# Patient Record
Sex: MALE | Race: WHITE | HISPANIC OR LATINO | Employment: FULL TIME | URBAN - METROPOLITAN AREA
[De-identification: names, ages, dates, MRNs, and addresses within clinical notes are randomized per-mention and may not be internally consistent; named-entity substitution may affect disease eponyms.]

---

## 2018-03-07 NOTE — PROGRESS NOTES
History of Present Illness    Revaccination   Vaccine Information: Vaccine(s) Given (names): Menactra [id number]  Unable to reach by phone  Pt called (attempt 1): [phone] KRL  Pt called (attempt 2): [phone] KRL   Letter Sent (Regular and Certified): [id number]  Other Information: [phone] [facility] cell for patient to call back  [phone] Certified letter sent  Active Problems    1  Acute bronchitis, unspecified organism (466 0) (J20 9)   2  Need for influenza vaccination (V04 81) (Z23)   3  Need for Menactra vaccination (V03 89) (Z23)   4  Need for revaccination (V05 9) (Z23)    Immunizations  Influenza --- Verl Nones: 08-Oct-2015   Meningococcal --- Series1: 08-Oct-2015     Current Meds   1  ProAir  (90 Base) MCG/ACT Inhalation Aerosol Solution; INHALE 2 PUFFS   EVERY 4 HOURS AS NEEDED FOR COUGH AND WHEEZE   2  Promethazine-DM 6 25-15 MG/5ML Oral Syrup; TAKE 5 ML EVERY 4 TO 6 HOURS AS   NEEDED FOR COUGH    Allergies    1   No Known Drug Allergies    Signatures   Electronically signed by : Crispin Martin MD; Jan 19 2018 11:00AM EST                       (Author)

## 2022-02-23 ENCOUNTER — HOSPITAL ENCOUNTER (EMERGENCY)
Facility: HOSPITAL | Age: 22
Discharge: HOME | End: 2022-02-23
Attending: EMERGENCY MEDICINE | Admitting: EMERGENCY MEDICINE
Payer: COMMERCIAL

## 2022-02-23 VITALS
SYSTOLIC BLOOD PRESSURE: 131 MMHG | BODY MASS INDEX: 28.14 KG/M2 | HEART RATE: 98 BPM | WEIGHT: 190 LBS | TEMPERATURE: 97.8 F | RESPIRATION RATE: 16 BRPM | DIASTOLIC BLOOD PRESSURE: 82 MMHG | HEIGHT: 69 IN | OXYGEN SATURATION: 96 %

## 2022-02-23 DIAGNOSIS — S39.012A STRAIN OF LUMBAR REGION: Primary | ICD-10-CM

## 2022-02-23 PROCEDURE — 96372 THER/PROPH/DIAG INJ SC/IM: CPT

## 2022-02-23 PROCEDURE — 99283 EMERGENCY DEPT VISIT LOW MDM: CPT

## 2022-02-23 PROCEDURE — 99284 EMERGENCY DEPT VISIT MOD MDM: CPT | Performed by: PHYSICIAN ASSISTANT

## 2022-02-23 RX ORDER — KETOROLAC TROMETHAMINE 30 MG/ML
15 INJECTION, SOLUTION INTRAMUSCULAR; INTRAVENOUS ONCE
Status: COMPLETED | OUTPATIENT
Start: 2022-02-23 | End: 2022-02-23

## 2022-02-23 RX ORDER — NAPROXEN 500 MG/1
500 TABLET ORAL 2 TIMES DAILY PRN
Qty: 10 TABLET | Refills: 0 | Status: SHIPPED | OUTPATIENT
Start: 2022-02-23

## 2022-02-23 RX ORDER — CYCLOBENZAPRINE HCL 10 MG
10 TABLET ORAL 2 TIMES DAILY PRN
Qty: 6 TABLET | Refills: 0 | Status: SHIPPED | OUTPATIENT
Start: 2022-02-23 | End: 2022-02-26

## 2022-02-23 RX ORDER — KETOROLAC TROMETHAMINE 30 MG/ML
15 INJECTION, SOLUTION INTRAMUSCULAR; INTRAVENOUS ONCE
Status: DISCONTINUED | OUTPATIENT
Start: 2022-02-23 | End: 2022-02-23

## 2022-02-23 RX ORDER — DIAZEPAM 5 MG/ML
5 INJECTION, SOLUTION INTRAMUSCULAR; INTRAVENOUS ONCE
Status: COMPLETED | OUTPATIENT
Start: 2022-02-23 | End: 2022-02-23

## 2022-02-23 RX ADMIN — DIAZEPAM 5 MG: 10 INJECTION, SOLUTION INTRAMUSCULAR; INTRAVENOUS at 11:51

## 2022-02-23 RX ADMIN — KETOROLAC TROMETHAMINE 15 MG: 30 INJECTION, SOLUTION INTRAMUSCULAR at 11:50

## 2022-02-23 NOTE — ED PROVIDER NOTES
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  He performs repetitive motion and dragging motion  No change in bowel bladder no saddle anesthesia  He walked in under his own power a senior remained in style  He only tried 1 dose of Motrin yesterday without improvement  He is nontoxic no acute distress  No history of IV drug use no red flags  No systemic symptoms  Patient appears to have muscle soreness from starting a new physical job          None       History reviewed  No pertinent past medical history  History reviewed  No pertinent surgical history  History reviewed  No pertinent family history  I have reviewed and agree with the history as documented  E-Cigarette/Vaping     E-Cigarette/Vaping Substances     Social History     Tobacco Use    Smoking status: Light Tobacco Smoker    Smokeless tobacco: Current User   Substance Use Topics    Alcohol use: Never    Drug use: Never       Review of Systems   Constitutional: Negative for diaphoresis, fatigue and fever  HENT: Negative for congestion, ear pain, nosebleeds and sore throat  Eyes: Negative for photophobia, pain, discharge and visual disturbance  Respiratory: Negative for cough, choking, chest tightness, shortness of breath and wheezing  Cardiovascular: Negative for chest pain and palpitations  Gastrointestinal: Negative for abdominal distention, abdominal pain, diarrhea and vomiting  Genitourinary: Negative for dysuria, flank pain and frequency  Musculoskeletal: Positive for back pain  Negative for gait problem and joint swelling  Skin: Negative for color change and rash  Neurological: Negative for dizziness, syncope and headaches  Psychiatric/Behavioral: Negative for behavioral problems and confusion   The patient is not nervous/anxious  All other systems reviewed and are negative  Physical Exam  Physical Exam  Vitals and nursing note reviewed  Constitutional:       General: He is not in acute distress  Appearance: Normal appearance  He is well-developed  He is not ill-appearing, toxic-appearing or diaphoretic  HENT:      Head: Normocephalic and atraumatic  Right Ear: Tympanic membrane, ear canal and external ear normal       Left Ear: Tympanic membrane, ear canal and external ear normal       Nose: Nose normal       Mouth/Throat:      Mouth: Mucous membranes are moist       Pharynx: Oropharynx is clear  No oropharyngeal exudate or posterior oropharyngeal erythema  Eyes:      General: No scleral icterus  Right eye: No discharge  Left eye: No discharge  Conjunctiva/sclera: Conjunctivae normal       Pupils: Pupils are equal, round, and reactive to light  Cardiovascular:      Rate and Rhythm: Normal rate and regular rhythm  Pulmonary:      Effort: Pulmonary effort is normal       Breath sounds: Normal breath sounds  Abdominal:      General: Bowel sounds are normal       Palpations: Abdomen is soft  Tenderness: There is no abdominal tenderness  Musculoskeletal:         General: Normal range of motion  Cervical back: Normal range of motion and neck supple  Back:       Right lower leg: No edema  Left lower leg: No edema  Skin:     General: Skin is warm  Capillary Refill: Capillary refill takes less than 2 seconds  Neurological:      General: No focal deficit present  Mental Status: He is alert and oriented to person, place, and time  Mental status is at baseline     Psychiatric:         Mood and Affect: Mood normal          Behavior: Behavior normal          Vital Signs  ED Triage Vitals [02/23/22 1116]   Temperature Pulse Respirations Blood Pressure SpO2   97 8 °F (36 6 °C) 98 16 131/82 96 %      Temp Source Heart Rate Source Patient Position - Orthostatic VS BP Location FiO2 (%)   Oral Monitor Sitting Right arm --      Pain Score       --           Vitals:    02/23/22 1116   BP: 131/82   Pulse: 98   Patient Position - Orthostatic VS: Sitting         Visual Acuity      ED Medications  Medications   diazepam (VALIUM) injection 5 mg (has no administration in time range)   ketorolac (TORADOL) injection 15 mg (has no administration in time range)       Diagnostic Studies  Results Reviewed     None                 No orders to display              Procedures  Procedures         ED Course                                             MDM    Disposition  Final diagnoses:   Strain of lumbar region     Time reflects when diagnosis was documented in both MDM as applicable and the Disposition within this note     Time User Action Codes Description Comment    2/23/2022 11:36 AM Rojelio Durand Add [S39 012A] Strain of lumbar region       ED Disposition     ED Disposition Condition Date/Time Comment    Discharge Stable Wed Feb 23, 2022 11:35 AM Miroslava Grimm discharge to home/self care              Follow-up Information     Follow up With Specialties Details Why Contact Info Additional [address] [facility] Schedule an appointment as soon as possible for a visit   [address], [address]          Patient's Medications   Discharge Prescriptions    CYCLOBENZAPRINE (FLEXERIL) 10 MG TABLET    Take 1 tablet (10 mg total) by mouth 2 (two) times a day as needed for muscle spasms for up to 3 days       Start Date: 2/23/2022 End Date: 2/26/2022       Order Dose: 10 mg       Quantity: 6 tablet    Refills: 0    NAPROXEN (NAPROSYN) 500 MG TABLET    Take 1 tablet (500 mg total) by mouth 2 (two) times a day as needed for mild pain       Start Date: 2/23/2022 End Date: --       Order Dose: 500 mg       Quantity: 10 tablet    Refills: 0       No discharge procedures on file      PDMP Review     None          ED Provider  Electronically Signed by           Millicent Denise PA-C  02/23/22 2795

## 2022-02-23 NOTE — Clinical Note
Tinlester Hernandez was seen and treated in our emergency department on 2/23/2022  Diagnosis: Lumbar strain    Ritesh Herndon    He may return on this date: 02/24/2022         If you have any questions or concerns, please don't hesitate to call        Ross Goldstein PA-C    ______________________________           _______________          _______________  Hospital Representative                              Date                                Time

## 2022-02-23 NOTE — Clinical Note
Imtiaz Martino was seen and treated in our emergency department on 2/23/2022  Diagnosis: Lumbar strain    Sushila Pascual    He may return on this date: 02/24/2022         If you have any questions or concerns, please don't hesitate to call        Arian Santiago PA-C    ______________________________           _______________          _______________  Hospital Representative                              Date                                Time

## 2022-02-23 NOTE — DISCHARGE INSTRUCTIONS
Follow-up with your family doctor for ongoing care    Return any worsening symptoms questions comments concerns

## 2024-06-25 ENCOUNTER — OFFICE VISIT (OUTPATIENT)
Dept: URGENT CARE | Facility: CLINIC | Age: 24
End: 2024-06-25

## 2024-06-25 VITALS
OXYGEN SATURATION: 99 % | TEMPERATURE: 97.3 F | RESPIRATION RATE: 18 BRPM | WEIGHT: 220 LBS | SYSTOLIC BLOOD PRESSURE: 129 MMHG | HEIGHT: 70 IN | HEART RATE: 74 BPM | BODY MASS INDEX: 31.5 KG/M2 | DIASTOLIC BLOOD PRESSURE: 79 MMHG

## 2024-06-25 DIAGNOSIS — J02.0 ACUTE STREPTOCOCCAL PHARYNGITIS: Primary | ICD-10-CM

## 2024-06-25 DIAGNOSIS — J02.9 SORE THROAT: ICD-10-CM

## 2024-06-25 LAB — S PYO AG THROAT QL: POSITIVE

## 2024-06-25 RX ORDER — AMOXICILLIN 500 MG/1
500 CAPSULE ORAL EVERY 12 HOURS SCHEDULED
Qty: 20 CAPSULE | Refills: 0 | Status: SHIPPED | OUTPATIENT
Start: 2024-06-25 | End: 2024-07-05

## 2024-06-25 NOTE — PROGRESS NOTES
"  [facility]        NAME: Scott Aldana is a 24 y.o. male  : 2000    MRN: [id number]  DATE: 2024  TIME: 11:27 AM    Assessment and Plan   Acute streptococcal pharyngitis [J02.0]  1. Acute streptococcal pharyngitis  amoxicillin (AMOXIL) 500 mg capsule      2. Sore throat  POCT rapid ANTIGEN strepA        Patient Instructions   Strep throat  Rapid strep positive  Rx amoxicillin twice daily x 7 days sent via EMR  Recommend warm salt water gargles  tylenol/ibuprofen as needed for pain/fever  Rest, fluids and supportive care     Follow up with PCP in 3-5 days.  Proceed to  ER if symptoms worsen.    If tests have been performed at [facility], our office will contact you with results if changes need to be made to the care plan discussed with you at the visit.  You can review your full results on [facility] MyChart.    Chief Complaint     Chief Complaint   Patient presents with    Cold Like Symptoms     C/O of dizziness, productive cough, chills, weak, fatigue and \"a migraine since Thursday.\" Took Aleve, Ibuprofen and Excedrin Migraine but does not help.         History of Present Illness       Scott is a 24-year-old male who presents to clinic complaining fatigue x 3 days.  Also notes diffuse headache, chills, cough, and nausea.  He describes the cough as productive with a white to yellow sputum.  He also notes night sweats.  He denies any fever, sore throat, ear pain, shortness of breath, vomiting, diarrhea, loss of taste or smell, recent travel, or any known sick contacts.        Review of Systems   Review of Systems   Constitutional:  Positive for chills and fatigue. Negative for fever.   HENT:  Negative for congestion, ear pain, rhinorrhea and sore throat.    Respiratory:  Positive for cough. Negative for shortness of breath.    Gastrointestinal:  Positive for nausea. Negative for diarrhea and vomiting.   Musculoskeletal:  Negative for myalgias.   Neurological:  Negative for headaches. " "        Current Medications       Current Outpatient Medications:     amoxicillin (AMOXIL) 500 mg capsule, Take 1 capsule (500 mg total) by mouth every 12 (twelve) hours for 10 days, Disp: 20 capsule, Rfl: 0    Current Allergies     Allergies as of 06/25/2024    (No Known Allergies)            The following portions of the patient's history were reviewed and updated as appropriate: allergies, current medications, past family history, past medical history, past social history, past surgical history and problem list.     History reviewed. No pertinent past medical history.    History reviewed. No pertinent surgical history.    Family History   Problem Relation Age of Onset    Diabetes Mother     Diabetes Father          Medications have been verified.        Objective   /79   Pulse 74   Temp (!) 97.3 °F (36.3 °C)   Resp 18   Ht 5' 10\" (1.778 m)   Wt 99.8 kg (220 lb)   SpO2 99%   BMI 31.57 kg/m²   No LMP for male patient.       Physical Exam     Physical Exam  Vitals and nursing note reviewed.   Constitutional:       General: He is not in acute distress.     Appearance: Normal appearance. He is not ill-appearing.   HENT:      Right Ear: Tympanic membrane, ear canal and external ear normal.      Left Ear: Tympanic membrane, ear canal and external ear normal.      Nose: Nose normal.      Mouth/Throat:      Mouth: Mucous membranes are moist.      Pharynx: Posterior oropharyngeal erythema present. No oropharyngeal exudate.   Cardiovascular:      Rate and Rhythm: Normal rate and regular rhythm.      Heart sounds: Normal heart sounds.   Pulmonary:      Effort: Pulmonary effort is normal.      Breath sounds: Normal breath sounds.   Lymphadenopathy:      Cervical: Cervical adenopathy present.   Neurological:      Mental Status: He is alert and oriented to person, place, and time.   Psychiatric:         Mood and Affect: Mood normal.         Behavior: Behavior normal.                   "

## 2024-06-25 NOTE — LETTER
June 25, 2024     Patient: Scott Aldana   YOB: 2000   Date of Visit: 6/25/2024       To Whom it May Concern:    Scott Aldana was seen in my clinic on 6/25/2024.  Please excuse from work on 6/25/2024 and 6/26/2024.    If you have any questions or concerns, please don't hesitate to call.         Sincerely,          Elena Underwood PA-C        CC: No Recipients

## 2024-08-06 ENCOUNTER — OFFICE VISIT (OUTPATIENT)
Age: 24
End: 2024-08-06

## 2024-08-06 VITALS
WEIGHT: 224 LBS | SYSTOLIC BLOOD PRESSURE: 132 MMHG | HEIGHT: 70 IN | RESPIRATION RATE: 18 BRPM | DIASTOLIC BLOOD PRESSURE: 78 MMHG | BODY MASS INDEX: 32.07 KG/M2 | HEART RATE: 88 BPM | OXYGEN SATURATION: 98 %

## 2024-08-06 DIAGNOSIS — R21 RASH AND NONSPECIFIC SKIN ERUPTION: ICD-10-CM

## 2024-08-06 DIAGNOSIS — E66.09 CLASS 1 OBESITY DUE TO EXCESS CALORIES WITHOUT SERIOUS COMORBIDITY WITH BODY MASS INDEX (BMI) OF 32.0 TO 32.9 IN ADULT: ICD-10-CM

## 2024-08-06 DIAGNOSIS — Z76.89 ENCOUNTER TO ESTABLISH CARE WITH NEW DOCTOR: Primary | ICD-10-CM

## 2024-08-06 DIAGNOSIS — Z59.89 UNINSURED: ICD-10-CM

## 2024-08-06 DIAGNOSIS — R53.83 OTHER FATIGUE: ICD-10-CM

## 2024-08-06 DIAGNOSIS — M79.671 BILATERAL FOOT PAIN: ICD-10-CM

## 2024-08-06 DIAGNOSIS — M79.672 BILATERAL FOOT PAIN: ICD-10-CM

## 2024-08-06 PROBLEM — Z87.39: Status: ACTIVE | Noted: 2024-08-06

## 2024-08-06 PROCEDURE — 99203 OFFICE O/P NEW LOW 30 MIN: CPT | Performed by: FAMILY MEDICINE

## 2024-08-06 SDOH — ECONOMIC STABILITY - INCOME SECURITY: OTHER PROBLEMS RELATED TO HOUSING AND ECONOMIC CIRCUMSTANCES: Z59.89

## 2024-08-06 NOTE — ASSESSMENT & PLAN NOTE
Has not tried anything  - Nonspecific erythema on inner thigh spread by scratching. Reports irritation worse with gowning at work?  - Advised to try OTC hydrocortisone  - Follow up at annual physical if not improving, consider antifungal

## 2024-08-06 NOTE — PROGRESS NOTES
Ambulatory Visit  Name: Andrea Ramon      : 2000      MRN: [id number]  Encounter Provider: Jaspal Wood MD  Encounter Date: 2024   Encounter department: [facility]    Assessment & Plan   1. Encounter to establish care with new doctor  2. Bilateral foot pain  Assessment & Plan:  Bilateral foot pain, getting more difficult to stand at work.   - Per patient he has previous history of tendonitis and may have had shoe inserts for flat feet. Not currently taking anything for the pain   - Advised to restart NSAID such as Ibuprofen, Aleve, etc  - Recommended PT. Patient will wait to see if any improvement with NSAID's before trying PT given he is self pay, but open to it  - Follow up at annual physical  3. Rash of inner thigh  Assessment & Plan:  Has not tried anything  - Nonspecific erythema on inner thigh spread by scratching. Reports irritation worse with gowning at work?  - Advised to try OTC hydrocortisone  - Follow up at annual physical if not improving, consider antifungal  4. Class 1 obesity due to excess calories without serious comorbidity with body mass index (BMI) of 32.0 to 32.9 in adult  -     Comprehensive metabolic panel; Future  -     Lipid Panel with Direct LDL reflex; Future  5. Other fatigue  -     CBC and differential; Future  6. Uninsured       History of Present Illness   {Disappearing Hyperlinks I Encounters * My Last Note * Since Last Visit * History :51594}  Presents to establish care.  Per patient, has worsening bilateral foot pain and reported history of tendinitis.  Patient reports it is difficult to stand for long periods of time now, works as a , in a prep room (industry?) but has pain after standing too long. He states he was previously taking NSAID and muscle relaxer, but currently not taking any medicine for the pain. States he has never tried PT.  May have had shoe inserts in the past for flatfeet.  Patient also reports rash on  "inner leg, which she thinks may be from the gown he wears at work.  Reports overall fatigue.  Self-pay, would like to check basic labs especially sugar.        Review of Systems   Constitutional:  Negative for fever.   Respiratory:  Negative for shortness of breath.    Cardiovascular:  Negative for chest pain.   Gastrointestinal:  Negative for abdominal pain.   Genitourinary:  Negative for dysuria, hematuria and penile discharge.   Musculoskeletal:         Bilateral foot pain   Skin:  Positive for rash (Inner thigh).   Neurological:  Negative for dizziness.   All other systems reviewed and are negative.      Objective   {Disappearing Hyperlinks   Review Vitals * Enter New Vitals * Results Review * Labs * Imaging * Cardiology * Procedures * Lung Cancer Screening :31326}  /78 (BP Location: Left arm, Patient Position: Sitting)   Pulse 88   Resp 18   Ht 5' 9.5\" (1.765 m)   Wt 102 kg (224 lb)   SpO2 98%   BMI 32.60 kg/m²     Physical Exam  Vitals reviewed.   Constitutional:       Appearance: He is obese. He is not ill-appearing.      Comments: Pleasant young adult male in no apparent distress. Girlfriend present at visit   HENT:      Head: Normocephalic.      Mouth/Throat:      Mouth: Mucous membranes are moist.   Eyes:      Extraocular Movements: Extraocular movements intact.      Conjunctiva/sclera: Conjunctivae normal.   Cardiovascular:      Rate and Rhythm: Normal rate and regular rhythm.      Pulses: Normal pulses.      Heart sounds: Normal heart sounds. No murmur heard.     No gallop.   Pulmonary:      Effort: Pulmonary effort is normal. No respiratory distress.      Breath sounds: Normal breath sounds. No wheezing.   Abdominal:      Palpations: Abdomen is soft.      Tenderness: There is no abdominal tenderness. There is no guarding.   Musculoskeletal:         General: No swelling.      Comments: Tenderness over medial arches of bilateral feet   Skin:     General: Skin is warm and dry.   Neurological: "      Mental Status: He is alert.      Gait: Gait abnormal.   Psychiatric:         Mood and Affect: Mood normal.         Behavior: Behavior normal.       Administrative Statements {Disappearing Hyperlinks I  [facility] of Service [id number]}

## 2024-08-06 NOTE — ASSESSMENT & PLAN NOTE
Bilateral foot pain, getting more difficult to stand at work.   - Per patient he has previous history of tendonitis and may have had shoe inserts for flat feet. Not currently taking anything for the pain   - Advised to restart NSAID such as Ibuprofen, Aleve, etc  - Recommended PT. Patient will wait to see if any improvement with NSAID's before trying PT given he is self pay, but open to it  - Follow up at annual physical

## 2024-08-06 NOTE — LETTER
August 6, 2024     Patient: Andrea Ramon  YOB: 2000  Date of Visit: 8/6/2024      To Whom it May Concern:    Andrea Ramon is under my professional care. Andrea was seen in my office on 8/6/2024. Andrea     Please allow Andrea to take frequent breaks (Every 90 minutes) as needed and allow him to sit as needed. He has a medical condition which is being optimized. Avoid gowning with scrubs if possible.     If you have any questions or concerns, please don't hesitate to call.      Sincerely,        Jaspal Wood MD        CC: No Recipients

## 2024-10-01 ENCOUNTER — HOSPITAL ENCOUNTER (EMERGENCY)
Facility: HOSPITAL | Age: 24
Discharge: HOME | End: 2024-10-01
Attending: EMERGENCY MEDICINE
Payer: COMMERCIAL

## 2024-10-01 VITALS
SYSTOLIC BLOOD PRESSURE: 131 MMHG | DIASTOLIC BLOOD PRESSURE: 76 MMHG | OXYGEN SATURATION: 98 % | TEMPERATURE: 97.6 F | HEART RATE: 54 BPM | RESPIRATION RATE: 18 BRPM

## 2024-10-01 DIAGNOSIS — G43.909 MIGRAINE HEADACHE: Primary | ICD-10-CM

## 2024-10-01 LAB
ALBUMIN SERPL BCG-MCNC: 4.7 G/DL (ref 3.5–5)
ALP SERPL-CCNC: 44 U/L (ref 34–104)
ALT SERPL W P-5'-P-CCNC: 51 U/L (ref 7–52)
ANION GAP SERPL CALCULATED.3IONS-SCNC: 8 MMOL/L (ref 4–13)
AST SERPL W P-5'-P-CCNC: 27 U/L (ref 13–39)
BASOPHILS # BLD AUTO: 0.12 THOUSANDS/ÂΜL (ref 0–0.1)
BASOPHILS NFR BLD AUTO: 2 % (ref 0–1)
BILIRUB SERPL-MCNC: 0.63 MG/DL (ref 0.2–1)
BUN SERPL-MCNC: 17 MG/DL (ref 5–25)
CALCIUM SERPL-MCNC: 9.5 MG/DL (ref 8.4–10.2)
CHLORIDE SERPL-SCNC: 104 MMOL/L (ref 96–108)
CO2 SERPL-SCNC: 23 MMOL/L (ref 21–32)
CREAT SERPL-MCNC: 0.91 MG/DL (ref 0.6–1.3)
EOSINOPHIL # BLD AUTO: 0.41 THOUSAND/ÂΜL (ref 0–0.61)
EOSINOPHIL NFR BLD AUTO: 6 % (ref 0–6)
ERYTHROCYTE [DISTWIDTH] IN BLOOD BY AUTOMATED COUNT: 12.6 % (ref 11.6–15.1)
GFR SERPL CREATININE-BSD FRML MDRD: 115 ML/MIN/1.73SQ M
GLUCOSE SERPL-MCNC: 103 MG/DL (ref 65–140)
HCT VFR BLD AUTO: 46.1 % (ref 36.5–49.3)
HGB BLD-MCNC: 15.9 G/DL (ref 12–17)
IMM GRANULOCYTES # BLD AUTO: 0.03 THOUSAND/UL (ref 0–0.2)
IMM GRANULOCYTES NFR BLD AUTO: 1 % (ref 0–2)
LYMPHOCYTES # BLD AUTO: 1.59 THOUSANDS/ÂΜL (ref 0.6–4.47)
LYMPHOCYTES NFR BLD AUTO: 25 % (ref 14–44)
MCH RBC QN AUTO: 29.4 PG (ref 26.8–34.3)
MCHC RBC AUTO-ENTMCNC: 34.5 G/DL (ref 31.4–37.4)
MCV RBC AUTO: 85 FL (ref 82–98)
MONOCYTES # BLD AUTO: 0.42 THOUSAND/ÂΜL (ref 0.17–1.22)
MONOCYTES NFR BLD AUTO: 7 % (ref 4–12)
NEUTROPHILS # BLD AUTO: 3.87 THOUSANDS/ÂΜL (ref 1.85–7.62)
NEUTS SEG NFR BLD AUTO: 59 % (ref 43–75)
NRBC BLD AUTO-RTO: 0 /100 WBCS
PLATELET # BLD AUTO: 283 THOUSANDS/UL (ref 149–390)
PMV BLD AUTO: 11.7 FL (ref 8.9–12.7)
POTASSIUM SERPL-SCNC: 4.6 MMOL/L (ref 3.5–5.3)
PROT SERPL-MCNC: 7.6 G/DL (ref 6.4–8.4)
RBC # BLD AUTO: 5.4 MILLION/UL (ref 3.88–5.62)
SODIUM SERPL-SCNC: 135 MMOL/L (ref 135–147)
WBC # BLD AUTO: 6.44 THOUSAND/UL (ref 4.31–10.16)

## 2024-10-01 PROCEDURE — 85025 COMPLETE CBC W/AUTO DIFF WBC: CPT | Performed by: EMERGENCY MEDICINE

## 2024-10-01 PROCEDURE — 99283 EMERGENCY DEPT VISIT LOW MDM: CPT

## 2024-10-01 PROCEDURE — 96372 THER/PROPH/DIAG INJ SC/IM: CPT

## 2024-10-01 PROCEDURE — 36415 COLL VENOUS BLD VENIPUNCTURE: CPT | Performed by: EMERGENCY MEDICINE

## 2024-10-01 PROCEDURE — 80053 COMPREHEN METABOLIC PANEL: CPT | Performed by: EMERGENCY MEDICINE

## 2024-10-01 PROCEDURE — 96365 THER/PROPH/DIAG IV INF INIT: CPT

## 2024-10-01 PROCEDURE — 96375 TX/PRO/DX INJ NEW DRUG ADDON: CPT

## 2024-10-01 PROCEDURE — 99284 EMERGENCY DEPT VISIT MOD MDM: CPT | Performed by: EMERGENCY MEDICINE

## 2024-10-01 RX ORDER — KETOROLAC TROMETHAMINE 30 MG/ML
30 INJECTION, SOLUTION INTRAMUSCULAR; INTRAVENOUS ONCE
Status: COMPLETED | OUTPATIENT
Start: 2024-10-01 | End: 2024-10-01

## 2024-10-01 RX ORDER — DIPHENHYDRAMINE HYDROCHLORIDE 50 MG/ML
25 INJECTION INTRAMUSCULAR; INTRAVENOUS ONCE
Status: COMPLETED | OUTPATIENT
Start: 2024-10-01 | End: 2024-10-01

## 2024-10-01 RX ORDER — METOCLOPRAMIDE HYDROCHLORIDE 5 MG/ML
10 INJECTION INTRAMUSCULAR; INTRAVENOUS ONCE
Status: COMPLETED | OUTPATIENT
Start: 2024-10-01 | End: 2024-10-01

## 2024-10-01 RX ORDER — BUTALBITAL, ACETAMINOPHEN AND CAFFEINE 50; 325; 40 MG/1; MG/1; MG/1
1 TABLET ORAL EVERY 6 HOURS PRN
Qty: 30 TABLET | Refills: 0 | Status: SHIPPED | OUTPATIENT
Start: 2024-10-01 | End: 2024-10-11

## 2024-10-01 RX ORDER — SUMATRIPTAN 6 MG/.5ML
6 INJECTION, SOLUTION SUBCUTANEOUS ONCE
Status: COMPLETED | OUTPATIENT
Start: 2024-10-01 | End: 2024-10-01

## 2024-10-01 RX ORDER — MAGNESIUM SULFATE HEPTAHYDRATE 40 MG/ML
2 INJECTION, SOLUTION INTRAVENOUS ONCE
Status: COMPLETED | OUTPATIENT
Start: 2024-10-01 | End: 2024-10-01

## 2024-10-01 RX ADMIN — MAGNESIUM SULFATE HEPTAHYDRATE 2 G: 40 INJECTION, SOLUTION INTRAVENOUS at 08:07

## 2024-10-01 RX ADMIN — SODIUM CHLORIDE 1000 ML: 0.9 INJECTION, SOLUTION INTRAVENOUS at 07:59

## 2024-10-01 RX ADMIN — SUMATRIPTAN 6 MG: 6 INJECTION, SOLUTION SUBCUTANEOUS at 08:17

## 2024-10-01 RX ADMIN — METOCLOPRAMIDE 10 MG: 5 INJECTION, SOLUTION INTRAMUSCULAR; INTRAVENOUS at 07:57

## 2024-10-01 RX ADMIN — KETOROLAC TROMETHAMINE 30 MG: 30 INJECTION, SOLUTION INTRAMUSCULAR; INTRAVENOUS at 08:06

## 2024-10-01 RX ADMIN — DIPHENHYDRAMINE HYDROCHLORIDE 25 MG: 50 INJECTION, SOLUTION INTRAMUSCULAR; INTRAVENOUS at 07:55

## 2024-10-01 NOTE — Clinical Note
Andrea Ramon was seen and treated in our emergency department on 10/1/2024.                Diagnosis:     Andrea  may return to work on return date.    He may return on this date:          If you have any questions or concerns, please don't hesitate to call.      Monica Ceron, DO    ______________________________           _______________          _______________  Hospital Representative                              Date                                Time

## 2024-10-01 NOTE — ED CARE HANDOFF
Emergency Department Sign Out Note        Sign out and transfer of care from previous provider. See Separate Emergency Department note.     The patient, Andrea Ramon, was evaluated by the previous provider for migraine headache.    Workup Completed:      ED Course / Workup Pending (followup):  Blood work and migraine cocktail                                  ED Course as of 10/01/24 0927   Tue Oct 01, 2024   0919 Patient reexamined at bedside.  Headache is significantly improved.  Patient is requesting to go home with a work note.  Patient also requesting contact information for neurology.     Procedures  Medical Decision Making  Patient's lab work was unremarkable.  Patient felt significantly better after migraine cocktail.  At this time patient will be discharged home on Fioricet and follow-up to neurology.  Close return instructions given to return to the ER for any worsening symptoms.  Patient agrees with discharge plan.  Patient well appearing at time of discharge.    Please Note: Fluency Direct voice recognition software may have been used in the creation of this document. Wrong words or sound a like substitutions may have occurred due to the inherent limitations of the voice software.         Amount and/or Complexity of Data Reviewed  Labs: ordered.    Risk  Prescription drug management.            Disposition  Final diagnoses:   Migraine headache     Time reflects when diagnosis was documented in both MDM as applicable and the Disposition within this note       Time User Action Codes Description Comment    10/1/2024  9:21 AM Monica Ceron Add [G43.909] Migraine headache           ED Disposition       ED Disposition   Discharge    Condition   Stable    Date/Time   Tue Oct 1, 2024  9:21 AM    Comment   Andrea Ramon discharge to home/self care.                   Follow-up Information       Follow up With Specialties Details Why Contact Info    [facility] Schedule an appointment as  soon as possible for a visit   [address] [place] [id number]  [phone]            Patient's Medications   Discharge Prescriptions    BUTALBITAL-ACETAMINOPHEN-CAFFEINE (FIORICET,ESGIC) -40 MG PER TABLET    Take 1 tablet by mouth every 6 (six) hours as needed for headaches or migraine for up to 10 days       Start Date: 10/1/2024 End Date: 10/11/2024       Order Dose: 1 tablet       Quantity: 30 tablet    Refills: 0     No discharge procedures on file.       ED Provider  Electronically Signed by     Monica Ceron DO  10/01/24 0927

## 2024-10-02 ENCOUNTER — TELEPHONE (OUTPATIENT)
Age: 24
End: 2024-10-02

## 2024-10-02 NOTE — TELEPHONE ENCOUNTER
Attempted Contacting Patient regarding recent ED Visit on 10/01/24.    Left message asking Patient to call the office to schedule Follow up appointment. Provided office hours and phone number.       ED:[facility]  CC: Migraine  DX:Migraine Headache  Time: 5:52am   Last OV:8/6/24

## 2024-10-04 NOTE — ED PROVIDER NOTES
Final Diagnosis:  1. Migraine headache        Chief Complaint   Patient presents with    Migraine     Pt states he's been had migraines for 8years states he's been having one for the last 2 years.  Pt states he might be diabetic sugar makes the headache go away     HPI  Pt has a  long hx of migraines. 8 years. Lately been inc in freq. 1 HA per week. No red flags like trauma, neck stiffness fever, neuro def, sudden thunderclap, morning ha, urinary incont personality change etc. Has photophobia. It's whole head. Pounding. This one started yest, tried OTC.     They do note they'd like to make sure he's not diabetic.     EMS additionally reports:     - Previous charting underwent limited review with attention to last ED visits, labs, ekgs, and prior imaging.  Chart review reveals :     Office Visit on 06/25/2024   Component Date Value Ref Range Status     RAPID STREP A 06/25/2024 Positive (A)  Negative Final       - No language barrier.   - History obtained from patient    - Discuss patient's care, with patient permission or by chart review, with sig other.      PMH:   has a past medical history of Abscess of eyelid (09/05/2012).    PSH:   has no past surgical history on file.     Social History:  Tobacco Use: Medium Risk (10/1/2024)    Patient History     Smoking Tobacco Use: Former     Smokeless Tobacco Use: Never     Passive Exposure: Not on file     Alcohol Use: Not At Risk (8/19/2020)    Received from [facility]    AUDIT-C     Frequency of Alcohol Consumption: Never     Average Number of Drinks: Not on file     Frequency of Binge Drinking: Not on file     No illicit use       ROS:  Pertinent positives/negatives: .     Some ROS may be present in the HPI and would take precedent over these standard questions asked below.   Review of Systems   Constitutional:  Negative for chills, diaphoresis, fatigue and fever.   Eyes:  Positive for photophobia.   Gastrointestinal:  Positive for nausea.   Musculoskeletal:   Negative for neck stiffness.   Neurological:  Positive for headaches. Negative for seizures, facial asymmetry, speech difficulty, weakness and numbness.        CONSTITUTIONAL:  No lethargy. No unexpected weight loss. No change in behavior.  EYES:  No pain, redness, or discharge. No loss of vision. No orbital trauma or pain.   ENT:  No tinnitus or decreased hearing. No epistaxis/purulent rhinorrhea. No voice change, airway closing, trismus.   CARDIOVASCULAR:  No chest pain. No skin mottling or pallor. No change in exertional capacity  RESPIRATORY:  No hemoptysis. No paroxysmal nocturnal dyspnea. No stridor. No apnea or bluing.   GASTROINTESTINAL:  No vomiting, diarrhea. No distension. No melena. No hematochezia.   GENITOURINARY:  No nocturia. No hematuria or foul smelling or cloudy urine. No discharge. No sores/adenopathy.   MUSCULOSKELETAL:  No contracture.  No new deformity.   INTEGUMENTARY:  No swelling. No unexpected contusions. No abrasions. No lymphangitis.  NEUROLOGIC:  No meningismus. No new numbness of the extremities. No new focal weakness. No postural instability  PSYCHIATRIC:  No SI HI AVH  HEMATOLOGICAL:  No bleeding. No petechiae. No bruising.  ALLERGIES:  No urticaria. No sudden abd cramping. No stridor.    PE:     Physical exam highlights:   Physical Exam       Vitals:    10/01/24 0637 10/01/24 0900   BP: 131/76 131/76   BP Location: Right arm    Pulse: 76 (!) 54   Resp: 16 18   Temp: 97.6 °F (36.4 °C)    TempSrc: Oral    SpO2: 98% 98%     Vitals reviewed by me.   Nursing note reviewed  Chaperone present for all sensitive exam.  Const: No acute distress. Alert. Nontoxic. Not diaphoretic.    HEENT: External ears normal. No protrusion drainage swelling. Nose normal. No drainage/traumatic deformity. MM. Mouth with baseline/symmetric movement. No trismus.   Eyes: No squinting. No icterus. No tearing/swelling/drainage. Tracks through the room with normal EOM.   Neck: ROM normal. No rigidity. No  meningismus.  Cards: Rate as per vitals Compared to monitor sinus unless documented. Regular Well perfused.  Pulm: Effort and excursion normal. No distress. No audible wheezing/no stridor. Normal resp rate without retraction or change in work of breathing.  Abd: No distension beyond baseline. No fluctuant wave. Patient without peritoneal pain with shifting/bumping the bed.   MSK: ROM normal baseline. No deformity. No contractures from baseline.   Skin: No new rashes visible. Well perfused. No wounds visualized on exposed skin  Neuro: Nonfocal. Baseline. CN grossly intact. Moving all four with coordination.   Psych: Normal behavior and affect.        A:  - Nursing note reviewed.    Ddx and MDM  Considered diagnoses    Really sounds like migraine, w/ perhaps need for suppressive meds.   Refer neuro    Migraine cocktail and reassess  Will oblige r/o Dm as best we do in ER w/ electrolyte check.   A1c w/ pcp would be better.           Dispo decision       My conversation with consultant reveals:        Decision rules:                           My read of the XR/CT scan reveals:     No orders to display       Orders Placed This Encounter   Procedures    CBC and differential    Comprehensive metabolic panel     Labs Reviewed   CBC AND DIFFERENTIAL - Abnormal       Result Value Ref Range Status    WBC 6.44  4.31 - 10.16 Thousand/uL Final    RBC 5.40  3.88 - 5.62 Million/uL Final    Hemoglobin 15.9  12.0 - 17.0 g/dL Final    Hematocrit 46.1  36.5 - 49.3 % Final    MCV 85  82 - 98 fL Final    MCH 29.4  26.8 - 34.3 pg Final    MCHC 34.5  31.4 - 37.4 g/dL Final    RDW 12.6  11.6 - 15.1 % Final    MPV 11.7  8.9 - 12.7 fL Final    Platelets 283  149 - 390 Thousands/uL Final    nRBC 0  /100 WBCs Final    Segmented % 59  43 - 75 % Final    Immature Grans % 1  0 - 2 % Final    Lymphocytes % 25  14 - 44 % Final    Monocytes % 7  4 - 12 % Final    Eosinophils Relative 6  0 - 6 % Final    Basophils Relative 2 (*) 0 - 1 % Final     Absolute Neutrophils 3.87  1.85 - 7.62 Thousands/µL Final    Absolute Immature Grans 0.03  0.00 - 0.20 Thousand/uL Final    Absolute Lymphocytes 1.59  0.60 - 4.47 Thousands/µL Final    Absolute Monocytes 0.42  0.17 - 1.22 Thousand/µL Final    Eosinophils Absolute 0.41  0.00 - 0.61 Thousand/µL Final    Basophils Absolute 0.12 (*) 0.00 - 0.10 Thousands/µL Final   COMPREHENSIVE METABOLIC PANEL    Sodium 135  135 - 147 mmol/L Final    Potassium 4.6  3.5 - 5.3 mmol/L Final    Chloride 104  96 - 108 mmol/L Final    CO2 23  21 - 32 mmol/L Final    ANION GAP 8  4 - 13 mmol/L Final    BUN 17  5 - 25 mg/dL Final    Creatinine 0.91  0.60 - 1.30 mg/dL Final    Comment: Standardized to IDMS reference method    Glucose 103  65 - 140 mg/dL Final    Comment: If the patient is fasting, the ADA then defines impaired fasting glucose as > 100 mg/dL and diabetes as > or equal to 123 mg/dL.    Calcium 9.5  8.4 - 10.2 mg/dL Final    AST 27  13 - 39 U/L Final    ALT 51  7 - 52 U/L Final    Comment: Specimen collection should occur prior to Sulfasalazine administration due to the potential for falsely depressed results.     Alkaline Phosphatase 44  34 - 104 U/L Final    Total Protein 7.6  6.4 - 8.4 g/dL Final    Albumin 4.7  3.5 - 5.0 g/dL Final    Total Bilirubin 0.63  0.20 - 1.00 mg/dL Final    Comment: Use of this assay is not recommended for patients undergoing treatment with eltrombopag due to the potential for falsely elevated results.  N-acetyl-p-benzoquinone imine (metabolite of Acetaminophen) will generate erroneously low results in samples for patients that have taken an overdose of Acetaminophen.    eGFR 115  ml/min/1.73sq m Final    Narrative:     National Kidney Disease Foundation guidelines for Chronic Kidney Disease (CKD):     Stage 1 with normal or high GFR (GFR > 90 mL/min/1.73 square meters)    Stage 2 Mild CKD (GFR = 60-89 mL/min/1.73 square meters)    Stage 3A Moderate CKD (GFR = 45-59 mL/min/1.73 square meters)    Stage  3B Moderate CKD (GFR = 30-44 mL/min/1.73 square meters)    Stage 4 Severe CKD (GFR = 15-29 mL/min/1.73 square meters)    Stage 5 End Stage CKD (GFR <15 mL/min/1.73 square meters)  Note: GFR calculation is accurate only with a steady state creatinine       *Each of these labs was reviewed. Particular standout labs will be noted in the ED Course above     Final Diagnosis:  1. Migraine headache          P:  - hospital tx includes   Medications   ketorolac (TORADOL) injection 30 mg (30 mg Intravenous Given 10/1/24 0806)   metoclopramide (REGLAN) injection 10 mg (10 mg Intravenous Given 10/1/24 0757)   diphenhydrAMINE (BENADRYL) injection 25 mg (25 mg Intravenous Given 10/1/24 0755)   SUMAtriptan (IMITREX) subcutaneous injection 6 mg (6 mg Subcutaneous Given 10/1/24 0817)   magnesium sulfate 2 g/50 mL IVPB (premix) 2 g (0 g Intravenous Stopped 10/1/24 0920)   sodium chloride 0.9 % bolus 1,000 mL (0 mL Intravenous Stopped 10/1/24 0921)         - disposition  Time reflects when diagnosis was documented in both MDM as applicable and the Disposition within this note       Time User Action Codes Description Comment    10/1/2024  9:21 AM Monica Ceron Add [G43.909] Migraine headache           ED Disposition       ED Disposition   Discharge    Condition   Stable    Date/Time   Tue Oct 1, 2024  9:21 AM    Comment   Andrea Ramon discharge to home/self care.                   Follow-up Information       Follow up With Specialties Details Why Contact [facility]    [facility] Schedule an appointment as soon as possible for a visit   [address] [place] [id number]  [phone]              - patient will call their PCP to let them know they were in the emergency department. We discuss return precautions and patient is agreeable with plan and aformentioned disposition.       - additional treatment intended, if consistent with primary provider:  - patient to follow with :      Discharge Medication List as of  "10/1/2024  9:24 AM        START taking these medications    Details   butalbital-acetaminophen-caffeine (FIORICET,ESGIC) -40 mg per tablet Take 1 tablet by mouth every 6 (six) hours as needed for headaches or migraine for up to 10 days, Starting Tue 10/1/2024, Until Fri 10/11/2024 at 2359, Normal           No discharge procedures on file.  None       Portions of the record may have been created with voice recognition software. Occasional wrong word or \"sound a like\" substitutions may have occurred due to the inherent limitations of voice recognition software. Read the chart carefully and recognize, using context, where substitutions have occurred.    Electronically signed by:  MD Leandro Villa MD  10/04/24 0645    "

## 2024-10-14 ENCOUNTER — TELEPHONE (OUTPATIENT)
Age: 24
End: 2024-10-14

## 2024-10-14 NOTE — TELEPHONE ENCOUNTER
Left message for patient to return call with up to date insurance information for upcoming appointment. If there is no insurance available an estimate has been attached to the visit for patient to bring when they arrive for visit.

## 2024-10-21 ENCOUNTER — TELEPHONE (OUTPATIENT)
Age: 24
End: 2024-10-21

## 2024-10-21 NOTE — TELEPHONE ENCOUNTER
Left message on patient voice mail requesting a call back with any updates insurance information for upcoming visit on 11/01/24. Also made patient aware that there is an estimate for visit if there is no insurance available.

## 2025-01-21 ENCOUNTER — TELEPHONE (OUTPATIENT)
Dept: OBGYN CLINIC | Facility: CLINIC | Age: 25
End: 2025-01-21

## 2025-01-21 ENCOUNTER — OFFICE VISIT (OUTPATIENT)
Dept: URGENT CARE | Facility: CLINIC | Age: 25
End: 2025-01-21
Payer: COMMERCIAL

## 2025-01-21 VITALS
SYSTOLIC BLOOD PRESSURE: 118 MMHG | BODY MASS INDEX: 35.19 KG/M2 | RESPIRATION RATE: 18 BRPM | DIASTOLIC BLOOD PRESSURE: 82 MMHG | HEART RATE: 77 BPM | HEIGHT: 70 IN | OXYGEN SATURATION: 99 % | TEMPERATURE: 96.6 F | WEIGHT: 245.8 LBS

## 2025-01-21 DIAGNOSIS — M62.830 SPASM OF MUSCLE OF LOWER BACK: Primary | ICD-10-CM

## 2025-01-21 PROCEDURE — 99213 OFFICE O/P EST LOW 20 MIN: CPT | Performed by: PHYSICIAN ASSISTANT

## 2025-01-21 RX ORDER — BUTALBITAL, ACETAMINOPHEN AND CAFFEINE 50; 325; 40 MG/1; MG/1; MG/1
1 TABLET ORAL EVERY 4 HOURS PRN
COMMUNITY

## 2025-01-21 RX ORDER — METAXALONE 800 MG/1
800 TABLET ORAL 3 TIMES DAILY
Qty: 15 TABLET | Refills: 0 | Status: SHIPPED | OUTPATIENT
Start: 2025-01-21 | End: 2025-01-21 | Stop reason: SDUPTHER

## 2025-01-21 RX ORDER — METAXALONE 800 MG/1
800 TABLET ORAL 3 TIMES DAILY
Qty: 15 TABLET | Refills: 0 | Status: SHIPPED | OUTPATIENT
Start: 2025-01-21 | End: 2025-01-26

## 2025-01-21 NOTE — LETTER
January 21, 2025     Patient: Andrea Ramon   YOB: 2000   Date of Visit: 1/21/2025       To Whom it May Concern:    Andrea Ramon was seen in my clinic on 1/21/2025. Please excuse from work on 1/22/2025.    If you have any questions or concerns, please don't hesitate to call.         Sincerely,          Jessie Cooper PA-C        CC: No Recipients

## 2025-01-21 NOTE — PROGRESS NOTES
[facility]        NAME: Andrea Ramon is a 25 y.o. male  : 2000    MRN: [id number]  DATE: 2025  TIME: 3:09 PM    Assessment and Plan   Spasm of muscle of lower back [M62.830]  1. Spasm of muscle of lower back  metaxalone (SKELAXIN) 800 mg tablet        Patient Instructions   Low back spasm  Rx skelaxin three times a day PRN sent via EMR  Apply heat and voltaren gel  When improved light back stretches  May consider PT or spine consult if persists    Follow up with PCP in 3-5 days.  Proceed to  ER if symptoms worsen.    If tests have been performed at [facility], our office will contact you with results if changes need to be made to the care plan discussed with you at the visit.  You can review your full results on [facility].    Chief Complaint     Chief Complaint   Patient presents with    Back Pain     X 1 month - has been feeling tight L low back. Pain worsened x 2 days ago and then shoveled yesterday.. Not using ice/heat. Took Advil 400 mg prn daily. No numbness/tingling or pain radiation.          History of Present Illness       Andrea-year-old male who presents to clinic complaining of low back pain x 1 month.  He states he has had a slight low back tightness he describes it as for 1 month and then yesterday while shoveling snow it got significantly worse.  He notes the most pain with bending down and with stairs.  He denies any paresthesias, swelling, bruising, or incontinence.        Review of Systems   Review of Systems   Constitutional: Negative.    Musculoskeletal:  Positive for back pain.         Current Medications       Current Outpatient Medications:     butalbital-acetaminophen-caffeine (FIORICET,ESGIC) -40 mg per tablet, Take 1 tablet by mouth every 4 (four) hours as needed for headaches, Disp: , Rfl:     metaxalone (SKELAXIN) 800 mg tablet, Take 1 tablet (800 mg total) by mouth 3 (three) times a day for 5 days, Disp: 15 tablet, Rfl: 0    Current  "Allergies     Allergies as of 01/21/2025    (No Known Allergies)            The following portions of the patient's history were reviewed and updated as appropriate: allergies, current medications, past family history, past medical history, past social history, past surgical history and problem list.     Past Medical History:   Diagnosis Date    Abscess of eyelid 09/05/2012    Procedure/Onset: 11/03/2008      Migraine        History reviewed. No pertinent surgical history.    Family History   Problem Relation Age of Onset    Diabetes Mother     Diabetes Father          Medications have been verified.        Objective   /82   Pulse 77   Temp (!) 96.6 °F (35.9 °C)   Resp 18   Ht 5' 10\" (1.778 m)   Wt 111 kg (245 lb 12.8 oz)   SpO2 99%   BMI 35.27 kg/m²   No LMP for male patient.       Physical Exam     Physical Exam  Vitals and nursing note reviewed.   Constitutional:       General: He is not in acute distress.     Appearance: Normal appearance. He is not ill-appearing.   Pulmonary:      Effort: Pulmonary effort is normal.   Musculoskeletal:      Lumbar back: Spasms and tenderness present. No swelling, edema or bony tenderness. Decreased range of motion.   Neurological:      Mental Status: He is alert and oriented to person, place, and time.   Psychiatric:         Mood and Affect: Mood normal.         Behavior: Behavior normal.                   "

## 2025-01-21 NOTE — TELEPHONE ENCOUNTER
I called patient and left him a message to give the office a call back on which [employer] Insurance he has for his appointment on 1/22/25 with Dr. Gooden.

## 2025-01-22 ENCOUNTER — OFFICE VISIT (OUTPATIENT)
Dept: OBGYN CLINIC | Facility: CLINIC | Age: 25
End: 2025-01-22
Payer: COMMERCIAL

## 2025-01-22 ENCOUNTER — APPOINTMENT (OUTPATIENT)
Dept: RADIOLOGY | Facility: CLINIC | Age: 25
End: 2025-01-22
Payer: COMMERCIAL

## 2025-01-22 VITALS — BODY MASS INDEX: 35.07 KG/M2 | WEIGHT: 245 LBS | HEIGHT: 70 IN

## 2025-01-22 DIAGNOSIS — M54.50 ACUTE LEFT-SIDED LOW BACK PAIN WITHOUT SCIATICA: Primary | ICD-10-CM

## 2025-01-22 DIAGNOSIS — M54.50 LOW BACK PAIN, UNSPECIFIED BACK PAIN LATERALITY, UNSPECIFIED CHRONICITY, UNSPECIFIED WHETHER SCIATICA PRESENT: ICD-10-CM

## 2025-01-22 PROCEDURE — 72100 X-RAY EXAM L-S SPINE 2/3 VWS: CPT

## 2025-01-22 PROCEDURE — 99203 OFFICE O/P NEW LOW 30 MIN: CPT | Performed by: ORTHOPAEDIC SURGERY

## 2025-01-22 RX ORDER — NAPROXEN 500 MG/1
500 TABLET ORAL 2 TIMES DAILY WITH MEALS
Qty: 60 TABLET | Refills: 0 | Status: SHIPPED | OUTPATIENT
Start: 2025-01-22

## 2025-01-22 NOTE — LETTER
January 22, 2025     Patient: Andrea Ramon  YOB: 2000  Date of Visit: 1/22/2025      To Whom it May Concern:    Andrea Ramon is under my professional care. Andrea was seen in my office on 1/22/2025.  Please excuse him from work from 1/22/25 through 1/25/25. Andrea may return to work on 1/27/2025. .    If you have any questions or concerns, please don't hesitate to call.         Sincerely,          Silvestre Gooden DO        CC: No Recipients

## 2025-06-04 ENCOUNTER — HOSPITAL ENCOUNTER (EMERGENCY)
Facility: HOSPITAL | Age: 25
Discharge: HOME | End: 2025-06-04
Attending: EMERGENCY MEDICINE | Admitting: EMERGENCY MEDICINE
Payer: COMMERCIAL

## 2025-06-04 VITALS
DIASTOLIC BLOOD PRESSURE: 83 MMHG | OXYGEN SATURATION: 97 % | SYSTOLIC BLOOD PRESSURE: 131 MMHG | TEMPERATURE: 98.9 F | RESPIRATION RATE: 20 BRPM | HEART RATE: 91 BPM

## 2025-06-04 DIAGNOSIS — J32.9 SINUSITIS: Primary | ICD-10-CM

## 2025-06-04 PROCEDURE — 99283 EMERGENCY DEPT VISIT LOW MDM: CPT

## 2025-06-04 PROCEDURE — 99284 EMERGENCY DEPT VISIT MOD MDM: CPT | Performed by: EMERGENCY MEDICINE

## 2025-06-04 NOTE — Clinical Note
Andrea Ramon was seen and treated in our emergency department on 6/4/2025.                Diagnosis:     Andrea  .    He may return on this date:     Please excuse from work for 2 days.      If you have any questions or concerns, please don't hesitate to call.      Duc Hancock, DO    ______________________________           _______________          _______________  Hospital Representative                              Date                                Time

## 2025-06-05 ENCOUNTER — TELEPHONE (OUTPATIENT)
Age: 25
End: 2025-06-05

## 2025-06-05 NOTE — TELEPHONE ENCOUNTER
[facility]- please remove Dr Wood as PCP. Confirmed with patient they have established care with a new PCP outside of the [facility] network.

## 2025-06-06 NOTE — ED PROVIDER NOTES
Time reflects when diagnosis was documented in both MDM as applicable and the Disposition within this note       Time User Action Codes Description Comment    6/4/2025  9:27 PM Duc Hancock Add [J32.9] Sinusitis           ED Disposition       ED Disposition   Discharge    Condition   Stable    Date/Time   Wed Jun 4, 2025  9:27 PM    Comment   Andrea Ramon discharge to home/self care.                   Assessment & Plan       Medical Decision Making  Pulse ox 97% on room air indicating adequate oxygenation.        No indication for antibiotics at this time discussed symptomatic treatment with over-the-counter medications.             Medications - No data to display    ED Risk Strat Scores                    No data recorded                            History of Present Illness       Chief Complaint   Patient presents with    URI     C/o funny feeling on L side of nose and blowing nose a lot, concerned last time this happened he had strep       Past Medical History[1]   Past Surgical History[2]   Family History[3]   Social History[4]   E-Cigarette/Vaping    E-Cigarette Use Never User     Start Date 6/7/24       E-Cigarette/Vaping Substances      I have reviewed and agree with the history as documented.     Patient presents for evaluation of a funny feeling in the left side of his nose and has been blowing his nose a lot more with some postnasal drip and a productive cough.  Denies any fever.  Symptoms for the last 2 days.  Concerned because last time this led to strep however he is refusing a strep test at this time because he does not think it is in his throat.  He is concerned because he shared a bong with another person who had some cold symptoms      History provided by:  Patient   used: No    URI  Presenting symptoms: congestion and cough        Review of Systems   HENT:  Positive for congestion.    Respiratory:  Positive for cough.    All other systems reviewed and are  negative.          Objective       ED Triage Vitals [06/04/25 2030]   Temperature Pulse Blood Pressure Respirations SpO2 Patient Position - Orthostatic VS   98.9 °F (37.2 °C) 91 131/83 20 97 % Sitting      Temp Source Heart Rate Source BP Location FiO2 (%) Pain Score    Tympanic Monitor Right arm -- No Pain      Vitals      Date and Time Temp Pulse SpO2 Resp BP Pain Score FACES Pain Rating User   06/04/25 2030 98.9 °F (37.2 °C) 91 97 % 20 131/83 No Pain -- LS            Physical Exam  Vitals and nursing note reviewed.   Constitutional:       General: He is not in acute distress.  HENT:      Nose: Congestion present.      Mouth/Throat:      Mouth: Mucous membranes are moist.      Pharynx: Oropharynx is clear. No oropharyngeal exudate or posterior oropharyngeal erythema.     Cardiovascular:      Rate and Rhythm: Normal rate and regular rhythm.   Pulmonary:      Effort: Pulmonary effort is normal. No respiratory distress.      Breath sounds: Normal breath sounds.     Neurological:      General: No focal deficit present.      Mental Status: He is alert and oriented to person, place, and time.         Results Reviewed       None            No orders to display       Procedures    ED Medication and Procedure Management   Prior to Admission Medications   Prescriptions Last Dose Informant Patient Reported? Taking?   butalbital-acetaminophen-caffeine (FIORICET,ESGIC) -40 mg per tablet   Yes No   Sig: Take 1 tablet by mouth every 4 (four) hours as needed for headaches   metaxalone (SKELAXIN) 800 mg tablet   No No   Sig: Take 1 tablet (800 mg total) by mouth 3 (three) times a day for 5 days   naproxen (NAPROSYN) 500 mg tablet   No No   Sig: Take 1 tablet (500 mg total) by mouth 2 (two) times a day with meals      Facility-Administered Medications: None     Discharge Medication List as of 6/4/2025  9:27 PM        CONTINUE these medications which have NOT CHANGED    Details   butalbital-acetaminophen-caffeine  (FIORICET,ESGIC) -40 mg per tablet Take 1 tablet by mouth every 4 (four) hours as needed for headaches, Historical Med      metaxalone (SKELAXIN) 800 mg tablet Take 1 tablet (800 mg total) by mouth 3 (three) times a day for 5 days, Starting Tue 1/21/2025, Until Sun 1/26/2025, Normal      naproxen (NAPROSYN) 500 mg tablet Take 1 tablet (500 mg total) by mouth 2 (two) times a day with meals, Starting Wed 1/22/2025, Normal           No discharge procedures on file.  ED SEPSIS DOCUMENTATION   Time reflects when diagnosis was documented in both MDM as applicable and the Disposition within this note       Time User Action Codes Description Comment    6/4/2025  9:27 PM Duc Hancock [J32.9] Sinusitis                    [1]   Past Medical History:  Diagnosis Date    Abscess of eyelid 09/05/2012    Procedure/Onset: 11/03/2008      Migraine    [2] No past surgical history on file.  [3]   Family History  Problem Relation Name Age of Onset    Diabetes Mother      Diabetes Father     [4]   Social History  Tobacco Use    Smoking status: Some Days     Types: Cigarettes    Smokeless tobacco: Never    Tobacco comments:     3-4 cigarettes/week   Vaping Use    Vaping status: Never Used   Substance Use Topics    Alcohol use: Never    Drug use: Yes     Types: Marijuana        Duc Hancock DO  06/06/25 [id number]

## 2025-08-01 ENCOUNTER — EVALUATION (OUTPATIENT)
Dept: PHYSICAL THERAPY | Facility: CLINIC | Age: 25
End: 2025-08-01
Payer: OTHER MISCELLANEOUS

## 2025-08-01 DIAGNOSIS — M54.50 LEFT LUMBAR PAIN: Primary | ICD-10-CM

## 2025-08-01 PROCEDURE — 97535 SELF CARE MNGMENT TRAINING: CPT

## 2025-08-01 PROCEDURE — 97110 THERAPEUTIC EXERCISES: CPT

## 2025-08-04 PROCEDURE — 97161 PT EVAL LOW COMPLEX 20 MIN: CPT
